# Patient Record
Sex: MALE | Race: WHITE
[De-identification: names, ages, dates, MRNs, and addresses within clinical notes are randomized per-mention and may not be internally consistent; named-entity substitution may affect disease eponyms.]

---

## 2018-10-24 ENCOUNTER — HOSPITAL ENCOUNTER (OUTPATIENT)
Dept: HOSPITAL 61 - NM | Age: 73
Discharge: HOME | End: 2018-10-24
Attending: INTERNAL MEDICINE
Payer: COMMERCIAL

## 2018-10-24 DIAGNOSIS — I11.9: Primary | ICD-10-CM

## 2018-10-24 PROCEDURE — 96376 TX/PRO/DX INJ SAME DRUG ADON: CPT

## 2018-10-24 PROCEDURE — 78452 HT MUSCLE IMAGE SPECT MULT: CPT

## 2018-10-24 PROCEDURE — A9500 TC99M SESTAMIBI: HCPCS

## 2018-10-24 PROCEDURE — 93017 CV STRESS TEST TRACING ONLY: CPT

## 2018-10-24 PROCEDURE — 93306 TTE W/DOPPLER COMPLETE: CPT

## 2018-10-24 PROCEDURE — 96374 THER/PROPH/DIAG INJ IV PUSH: CPT

## 2018-10-24 NOTE — CARD
MR#: [id number]

Account#: [id number]

Accession#: [id number]

Date of Study: 10/24/2018

Ordering Physician: DENNYS CARTY, 

Referring Physician: EDNNYS CARTY, 

Tech: Dennise Berry RDCS





--------------- APPROVED REPORT --------------





EXAM: Two-dimensional and M-mode echocardiogram with Doppler and color Doppler.



Other Information 

Quality : Good



INDICATION

Hypertension/HCVD



2D DIMENSIONS 

RVDd2.4 (2.9-3.5cm)Left Atrium(2D)4.4 (1.6-4.0cm)

IVSd1.6 (0.7-1.1cm)Aortic Root(2D)2.9 (2.0-3.7cm)

LVDd4.8 (3.9-5.9cm)LVOT Diameter2.2 (1.8-2.4cm)

PWd1.2 (0.7-1.1cm)LVDs2.4 (2.5-4.0cm)

FS (%) 30.2 %SV87.5 ml

LVEF(%)60.0 (>50%)



Aortic Valve

AoV Peak Jevon.124.0cm/sAoV VTI23.0cm

AO Peak GR.6.2mmHgLVOT Peak Jevon.85.2cm/s

AO Mean GR.4mmHgAVA (VMAX)2.50cm2

RUDI   (VTI)2.20cm2



Mitral Valve

MV E Akmaodca84.0cm/sMV DECEL [id number]

MV A Ofthbesr20.8cm/sE/A  Ratio1.1



Pulmonary Vein

S1 Ddepalla11.3cm/sD2 Qzlxxdls95.3cm/s



 LEFT VENTRICLE 

The left ventricle is normal size. There is mild to moderate concentric left ventricular hypertrophy.
 The left ventricular systolic function is normal. The Ejection Fraction is 65-70%. There is normal L
V segmental wall motion. Transmitral Doppler flow pattern is Grade II-pseudonormal filling dynamics.



 RIGHT VENTRICLE 

The right ventricle is normal size. The right ventricular systolic function is normal.



 ATRIA 

The left atrium is mildly dilated. The right atrium size is normal. The interatrial septum is intact 
with no evidence for an atrial septal defect or patent foramen ovale as noted on 2-D or Doppler imagi
ng.



 AORTIC VALVE 

The aortic valve is calcified but opens well. Doppler and Color Flow revealed no significant aortic r
egurgitation. There is no significant aortic valvular stenosis.



 MITRAL VALVE 

The mitral valve is calcified but opens well. There is no evidence of mitral valve prolapse. There is
 no mitral valve stenosis. Doppler and Color-flow revealed trace mitral regurgitation.



 TRICUSPID VALVE 

The tricuspid valve is normal in structure and function. Doppler and Color Flow revealed no tricuspid
 valve regurgitation noted. There is no tricuspid valve stenosis.



 PULMONIC VALVE 

The pulmonic valve is not well visualized. Doppler and Color Flow revealed trace pulmonic valvular re
gurgitation. There is no pulmonic valvular stenosis.



 GREAT VESSELS 

The aortic root is normal in size. The ascending aorta is normal in size. The IVC is normal in size a
nd collapses >50% with inspiration.



 PERICARDIAL EFFUSION 

There is no evidence of significant pericardial effusion.



Critical Notification

Critical Value: No



<Conclusion>

The left ventricular systolic function is normal.

The Ejection Fraction is 65-70%.

There is normal LV segmental wall motion.

There is mild to moderate concentric left ventricular hypertrophy.

The left atrium is mildly dilated.

Trace mitral regurgitation.

There is no evidence of significant pericardial effusion.



Signed by : Jose E Castellano, 

Electronically Approved : 10/24/2018 12:14:21

## 2018-10-24 NOTE — RAD
MR#: [id number]

Account#: [id number]

Accession#: [id number]

Date of Study: 10/24/2018

Ordering Physician: DENNYS CARTY, 

Referring Physician: MONICA JACQUES Tech: ANA Das ARRT (R) (N)





--------------- APPROVED REPORT --------------





Test Type:          Exercise

Stress Nurse/Tech: JONO Zarate RN

Test Indications: HTN

Cardiac History: HTN

Medications:     See Electronic Medical Record

Medical History: See Electronic Medical Record

Resting ECG:     SR

Resting Heart Rate: 60 bpm

Resting Blood Pressure: 171/102mmHg

Pretest Chest Pain: No chest pain



Nurse/Tech Notes

Lungs CTA, S1S2, SR

Consent: The procedure was explained to the patient in lay terms. Informed consent was witnessed. Olegario
eout was entered into "[employer]. History and Stress Test performed by JONO Zarate RN



Stress Symptoms

dyspnea, no chest pain



POST EXERCISE

Reason for Termination: Reached target heart rate

Target HR: 124

Exercise duration: 11:16 min:sec, 2 Stage

Max Blood Pressure: 199/98mmHg

Blood Pressure response to exercise: Normal blood pressure response during stress.

Heart Rate response to exercise: normal response

Chest Pain: No. 

Arrhythmia: No. 

ST Change: No. 



INTERPRETATION

Stress EKG Conclusion: Baseline EKG showed sinus rhythm.  No ischemic changes at peak stress.  No arr
hythmias.



Imaging Protocol

IMAGE PROTOCOL: Rest Tc-99m/stress Tc-99m 1 day



Rest:            Stress:         Viability:   

Radiopharm.Tc99m NfdlpatwkFk15i Sestamibi

Dose11.8mCi            32mCi            

Img Date  10/24/2018      10/24/2018      

Inj-Img [id number].           75min.           



Rest Admin Site:IV - Left AntecubitalAdministrator:ANA Das ARRT (R)(N)

Stress Admin Site: IV - Left AntecubitalAdministrator: FAYE Guerra



STRESS DATA

End Diast. Vol.138.0mlAv. Heart Rate65.0bpm

End Syst. Vol.75.0mlCO Index BSA0.0L/min

Myocardial Ryrr234.0gEject. Wryzkjuf19.0%



Stress Rates

Pk. Fill Rate0.95EDV/secLVtime Pk. Fill 244.97msec

Pk. Empty Rate2.48ESV/secLVtime Pk. Bhucf573.39msec

1/3 Pk. Fill0.37EDV/sec



Stress Scores

Regional WT3.00Summed WT45.00

Regional WM0.00Summed WM25.00



LV Perfusion

Scintigraphic images did not show any significant fixed or reversible defects.



Wall Motion

Mild left ventricle systolic dysfunction with ejection fraction calculated at 46%.



LV Perf. Quant

17 Seg. SSS1.00

17 Seg. SRS0.00

17 Seg. SDS1.00

Stress Defect Extent (% LAD)0.00Rest Defect Extent (% LAD)0.00Rev. Defect Extent (% LAD)0.00

Stress Defect Extent (% LCX) 13.80Rest Defect Extent (% LCX)5.00Rev. Defect Extent (% LCX)0.00

Stress Defect Extent (% RCA)0.00Rest Defect Extent (% RCA)0.00Rev. Defect Extent (% RCA)0.00

Stress Defect Extent (% VANE)2.40Rest Defect Extent (% VANE)0.90Rev. Defect Extent (% VANE)0.00



Conclusion

1. Treadmill exercise cardioisotope stress test did not show any evidence of ischemia or infarct.

2. Mild left ventricle systolic dysfunction with ejection fraction calculated at 46%.

3. Patient had good activity tolerance. Low risk for cardiac events.



Signed by : Jose E Castellano, 

Electronically Approved : 10/24/2018 14:07:45